# Patient Record
Sex: MALE
[De-identification: names, ages, dates, MRNs, and addresses within clinical notes are randomized per-mention and may not be internally consistent; named-entity substitution may affect disease eponyms.]

---

## 2020-05-11 ENCOUNTER — HOSPITAL ENCOUNTER (OUTPATIENT)
Dept: HOSPITAL 88 - MRI | Age: 60
End: 2020-05-11
Attending: INTERNAL MEDICINE
Payer: COMMERCIAL

## 2020-05-11 DIAGNOSIS — R42: Primary | ICD-10-CM

## 2020-05-11 PROCEDURE — 70551 MRI BRAIN STEM W/O DYE: CPT

## 2020-05-11 NOTE — DIAGNOSTIC IMAGING REPORT
EXAMINATION: MRI of the brain without contrast.



HISTORY: Dizziness

COMPARISON: None.

TECHNIQUE: Sagittal T2; axial DWI, T2, FLAIR, T1-IR, T2 gradient echo; coronal

FLAIR. 



FINDINGS:

     Parenchyma: 

1.  Tiny cortico-subcortical encephalomalacia in the anterior margin of the

right superior temporal gyrus may represent sequela from remote trauma.

2.  A few scattered and minimal confluent periventricular white matter T2 and

FLAIR hyperintense foci, which is most likely age related minimal chronic

microvascular ischemic changes. Otherwise there are no areas of abnormal signal

intensity in the brain parenchyma. 

3.  No mass, hemorrhage, or acute infarcts.



     Skull: Unremarkable.       

     Vessels: Expected flow voids present in the major arteries and dural

sinuses.

     Extra-axial spaces: No abnormal signal intensity or mass effect.

     Brain volume: Within normal limits for age.

     Ventricles: No hydrocephalus or displacement.

     Foramen magnum: Unremarkable.  

     Sella: Unremarkable.  

     Paranasal / mastoid sinuses: No significant inflammatory disease.



IMPRESSION:



1.  No intracranial mass, hydrocephalus or acute infarcts.

2.  Tiny cortico-subcortical encephalomalacia in the right temporal lobe may

represent the sequela from remote trauma in the appropriate clinical setting.

3.  Minimal age-related chronic microvascular ischemic changes.



Signed by: Dr. Cait Juarez M.D. on 5/11/2020 11:41 AM

## 2021-01-03 ENCOUNTER — HOSPITAL ENCOUNTER (OUTPATIENT)
Dept: HOSPITAL 88 - VACCPMC | Age: 61
End: 2021-01-03
Payer: COMMERCIAL

## 2021-01-03 DIAGNOSIS — Z23: Primary | ICD-10-CM

## 2021-01-03 DIAGNOSIS — Z20.822: ICD-10-CM

## 2021-02-07 ENCOUNTER — HOSPITAL ENCOUNTER (OUTPATIENT)
Dept: HOSPITAL 88 - VACCPMC | Age: 61
DRG: 951 | End: 2021-02-07
Payer: COMMERCIAL

## 2021-02-07 DIAGNOSIS — Z23: Primary | ICD-10-CM

## 2021-02-07 DIAGNOSIS — Z20.822: ICD-10-CM

## 2021-02-07 PROCEDURE — 91301: CPT

## 2021-02-07 PROCEDURE — 0012A: CPT
